# Patient Record
Sex: FEMALE | Race: WHITE
[De-identification: names, ages, dates, MRNs, and addresses within clinical notes are randomized per-mention and may not be internally consistent; named-entity substitution may affect disease eponyms.]

---

## 2019-08-04 ENCOUNTER — HOSPITAL ENCOUNTER (EMERGENCY)
Dept: HOSPITAL 10 - E/R | Age: 29
Discharge: HOME | End: 2019-08-04
Payer: MEDICAID

## 2019-08-04 ENCOUNTER — HOSPITAL ENCOUNTER (EMERGENCY)
Dept: HOSPITAL 91 - E/R | Age: 29
Discharge: HOME | End: 2019-08-04
Payer: MEDICAID

## 2019-08-04 VITALS — HEART RATE: 65 BPM | RESPIRATION RATE: 16 BRPM | DIASTOLIC BLOOD PRESSURE: 60 MMHG | SYSTOLIC BLOOD PRESSURE: 111 MMHG

## 2019-08-04 VITALS
WEIGHT: 135.14 LBS | BODY MASS INDEX: 23.95 KG/M2 | HEIGHT: 63 IN | WEIGHT: 135.14 LBS | HEIGHT: 63 IN | BODY MASS INDEX: 23.95 KG/M2

## 2019-08-04 DIAGNOSIS — J45.909: ICD-10-CM

## 2019-08-04 DIAGNOSIS — N12: Primary | ICD-10-CM

## 2019-08-04 DIAGNOSIS — F17.210: ICD-10-CM

## 2019-08-04 LAB
ADD MAN DIFF?: NO
ADD UMIC: YES
ANION GAP: 8 (ref 5–13)
BASOPHIL #: 0.1 10^3/UL (ref 0–0.1)
BASOPHILS %: 0.4 % (ref 0–2)
BLOOD UREA NITROGEN: 8 MG/DL (ref 7–20)
CALCIUM: 9.9 MG/DL (ref 8.4–10.2)
CARBON DIOXIDE: 26 MMOL/L (ref 21–31)
CHLORIDE: 105 MMOL/L (ref 97–110)
CREATININE: 0.72 MG/DL (ref 0.44–1)
EOSINOPHILS #: 0.2 10^3/UL (ref 0–0.5)
EOSINOPHILS %: 1.1 % (ref 0–7)
GLUCOSE: 95 MG/DL (ref 70–220)
HEMATOCRIT: 38 % (ref 37–47)
HEMOGLOBIN: 12.8 G/DL (ref 12–16)
IMMATURE GRANS #M: 0.09 10^3/UL (ref 0–0.03)
IMMATURE GRANS % (M): 0.5 % (ref 0–0.43)
LYMPHOCYTES #: 2.3 10^3/UL (ref 0.8–2.9)
LYMPHOCYTES %: 12.7 % (ref 15–51)
MEAN CORPUSCULAR HEMOGLOBIN: 31.1 PG (ref 29–33)
MEAN CORPUSCULAR HGB CONC: 33.7 G/DL (ref 32–37)
MEAN CORPUSCULAR VOLUME: 92.5 FL (ref 82–101)
MEAN PLATELET VOLUME: 10.3 FL (ref 7.4–10.4)
MONOCYTE #: 1.1 10^3/UL (ref 0.3–0.9)
MONOCYTES %: 6.3 % (ref 0–11)
NEUTROPHIL #: 14.1 10^3/UL (ref 1.6–7.5)
NEUTROPHILS %: 79 % (ref 39–77)
NUCLEATED RED BLOOD CELLS #: 0 10^3/UL (ref 0–0)
NUCLEATED RED BLOOD CELLS%: 0 /100WBC (ref 0–0)
PLATELET COUNT: 278 10^3/UL (ref 140–415)
POTASSIUM: 3.3 MMOL/L (ref 3.5–5.1)
RED BLOOD COUNT: 4.11 10^6/UL (ref 4.2–5.4)
RED CELL DISTRIBUTION WIDTH: 12.6 % (ref 11.5–14.5)
SODIUM: 139 MMOL/L (ref 135–144)
UR ASCORBIC ACID: NEGATIVE MG/DL
UR BACTERIA: (no result) /HPF
UR BILIRUBIN (DIP): NEGATIVE MG/DL
UR BLOOD (DIP): (no result) MG/DL
UR CLARITY: (no result)
UR COLOR: YELLOW
UR GLUCOSE (DIP): NEGATIVE MG/DL
UR KETONES (DIP): (no result) MG/DL
UR LEUKOCYTE ESTERASE (DIP): (no result) LEU/UL
UR MUCUS: (no result) /HPF
UR NITRITE (DIP): NEGATIVE MG/DL
UR PH (DIP): 9 (ref 5–9)
UR RBC: 17 /HPF (ref 0–5)
UR SPECIFIC GRAVITY (DIP): 1.01 (ref 1–1.03)
UR SQUAMOUS EPITHELIAL CELL: (no result) /HPF
UR TOTAL PROTEIN (DIP): (no result) MG/DL
UR UROBILINOGEN (DIP): NEGATIVE MG/DL
UR WBC: > 182 /HPF (ref 0–5)
WHITE BLOOD COUNT: 17.8 10^3/UL (ref 4.8–10.8)

## 2019-08-04 PROCEDURE — 81001 URINALYSIS AUTO W/SCOPE: CPT

## 2019-08-04 PROCEDURE — 99284 EMERGENCY DEPT VISIT MOD MDM: CPT

## 2019-08-04 PROCEDURE — 85025 COMPLETE CBC W/AUTO DIFF WBC: CPT

## 2019-08-04 PROCEDURE — 81025 URINE PREGNANCY TEST: CPT

## 2019-08-04 PROCEDURE — 80048 BASIC METABOLIC PNL TOTAL CA: CPT

## 2019-08-04 PROCEDURE — 96372 THER/PROPH/DIAG INJ SC/IM: CPT

## 2019-08-04 RX ADMIN — IBUPROFEN 1 MG: 600 TABLET ORAL at 20:10

## 2019-08-04 RX ADMIN — CEFTRIAXONE SODIUM 1 GM: 1 INJECTION, POWDER, FOR SOLUTION INTRAMUSCULAR; INTRAVENOUS at 20:10

## 2019-08-04 NOTE — ERD
ER Documentation


Chief Complaint


Chief Complaint





LEFT FLANK PAIN X1DAY; HX OF KIDNEY STONES





HPI


28-year-old female presenting with left flank pain that started today.  She 


states that she has a history of a couple of kidney infections, always on the 


left side.  Pain is severe, 8 out of 10, aching and stabbing, constant, with no 


alleviating factors.  Exacerbated by movement.  She states she never has dysuria


hematuria or any other warning signs.  She states that infection usually goes 


immediately to her kidney.  She has refused hospitalizations in the past because


she has several kids at home.  Currently she feels it is very early in the 


course of her infection and wants to go home with oral antibiotics and does not 


want to be admitted in any case.  She denies any fever, chills, nausea, 


vomiting.





ROS


All systems reviewed and are negative except as per history of present illness.





Medications


Home Meds


Active Scripts


Cephalexin* (Keflex*) 500 Mg Capsule, 500 MG PO QID for 10 Days, CAP


   Prov:CHIKIS ESPOSITO MD         8/4/19


Ibuprofen* (Motrin*) 600 Mg Tab, 600 MG PO Q6H PRN for PAIN AND OR ELEVATED 


TEMP, #30 TAB


   Prov:CHIKIS ESPOSITO MD         8/4/19





Allergies


Allergies:  


Coded Allergies:  


     acetaminophen (Verified  Allergy, Unknown, 8/4/19)


     hydrocodone (Verified  Allergy, Unknown, 8/4/19)





PMhx/Soc


History of Surgery:  Yes (tubal ligation)


Hx Respiratory Disorders:  Yes (asthma)


Hx Miscellaneous Medical Probl:  Yes (frequent UTIs)


Hx Alcohol Use:  Yes (once every other month)


Hx Substance Use:  Yes (marijuana daily)


Hx Tobacco Use:  Yes (1/2 pack daily)


Smoking Status:  Current every day smoker





FmHx


Family History:  No diabetes





Physical Exam


Vitals





Vital Signs


  Date      Temp  Pulse  Resp  B/P (MAP)   Pulse Ox  O2          O2 Flow    FiO2


Time                                                 Delivery    Rate


    8/4/19  99.0     65    16      111/60        98  Room Air


     21:21                           (77)


    8/4/19  99.1     79    16      108/75       100  Room Air


     20:03                           (86)


    8/4/19  99.3    120    20      127/87        99


     18:23                          (100)





Physical Exam


Const:   No acute distress, well-appearing, nontoxic


Head:   Atraumatic 


Eyes:    Normal Conjunctiva


ENT:    Normal External Ears, Nose and Mouth.


Neck:               Full range of motion. No meningismus.


Resp:   Clear to auscultation bilaterally


Cardio:   Tachycardic with regular  rhythm, no murmurs


Abd:    Soft, non tender, non distended. Normal bowel sounds


Skin:   No petechiae or rashes


Back:   Moderate left CVA tenderness


Ext:    No cyanosis, or edema


Neur:   Awake and alert


Psych:    Normal Mood and Affect


Result Diagram:  


8/4/19 1959 8/4/19 1959





Results 24 hrs





Laboratory Tests


Test
                                 8/4/19
19:50    8/4/19
19:59  8/4/19
20:07


Urine Color                        YELLOW


Urine Clarity                      CLOUDY


Urine pH                                      9.0


Urine Specific Gravity                      1.008


Urine Ketones                            2+ mg/dL


Urine Nitrite                      NEGATIVE mg/dL


Urine Bilirubin                    NEGATIVE mg/dL


Urine Urobilinogen                 NEGATIVE mg/dL


Urine Leukocyte Esterase                3+ Doe/ul


Urine Microscopic RBC                     17 /HPF


Urine Microscopic WBC              > 182 /HPF


Urine Squamous Epithelial
Cells    FEW /HPF 
       
               



Urine Bacteria                     FEW /HPF


Urine Mucus                        FEW /HPF


Urine Hemoglobin                         2+ mg/dL


Urine Glucose                      NEGATIVE mg/dL


Urine Total Protein                      2+ mg/dl


White Blood Count                                    17.8 10^3/ul


Red Blood Count                                      4.11 10^6/ul


Hemoglobin                                              12.8 g/dl


Hematocrit                                                 38.0 %


Mean Corpuscular Volume                                   92.5 fl


Mean Corpuscular Hemoglobin                               31.1 pg


Mean Corpuscular                   
                   33.7 g/dl 
  



Hemoglobin
Concent


Red Cell Distribution Width                                12.6 %


Platelet Count                                        278 10^3/UL


Mean Platelet Volume                                      10.3 fl


Immature Granulocytes %                                   0.500 %


Neutrophils %                                              79.0 %


Lymphocytes %                                              12.7 %


Monocytes %                                                 6.3 %


Eosinophils %                                               1.1 %


Basophils %                                                 0.4 %


Nucleated Red Blood Cells %                           0.0 /100WBC


Immature Granulocytes #                             0.090 10^3/ul


Neutrophils #                                        14.1 10^3/ul


Lymphocytes #                                         2.3 10^3/ul


Monocytes #                                           1.1 10^3/ul


Eosinophils #                                         0.2 10^3/ul


Basophils #                                           0.1 10^3/ul


Nucleated Red Blood Cells #                           0.0 10^3/ul


Sodium Level                                           139 mmol/L


Potassium Level                                        3.3 mmol/L


Chloride Level                                         105 mmol/L


Carbon Dioxide Level                                    26 mmol/L


Anion Gap                                                       8


Blood Urea Nitrogen                                       8 mg/dl


Creatinine                                             0.72 mg/dl


Est Glomerular Filtrat             
                > 60 mL/min 
   



Rate
mL/min


Glucose Level                                            95 mg/dl


Calcium Level                                           9.9 mg/dl


POC Beta HCG, Qualitative                                           NEGATIVE





Current Medications


 Medications
   Dose
          Sig/Todd
       Start Time
   Status  Last


 (Trade)       Ordered        Route
 PRN     Stop Time              Admin
Dose


                              Reason                                Admin


 Ceftriaxone    1 gm           ONCE  ONCE
    8/4/19        DC            8/4/19


Sodium
                       IM
            20:30
 8/4/19                20:10



(Rocephin)                                   20:31


 Ibuprofen
     600 mg         ONCE  ONCE
    8/4/19        DC            8/4/19


(Motrin)                      PO
            20:30
 8/4/19                20:10



                                             20:31








Procedures/MDM


EMERGENT LABS AND DIAGNOSTIC STUDIES: 


Lab Results above were reviewed and interpreted by me. 





CBC: Leukocytosis, concerning for infection


BMP: No evidence of clinically significant electrolyte abnormality, acidosis, 


renal failure, hypoglycemia


UA: + evidence of infection 





___________________________________________________________ 


Initial Nursing notes reviewed. 


Previous Medical Records requested via the Electronic Health Record. 





EMERGENCY DEPARTMENT COURSE / MEDICAL DECISION MAKING: 





Patient is presenting with signs and symptoms of sepsis and pyelonephritis.  She


was given a shot of Rocephin here.  Basic work-up was done.  I did again offer 


the patient admission but she would rather go home.  I feel this is an 


acceptable plan at this time.  I gave her a prescription for Keflex as this is 


helped her in the past.  I encouraged her to return for any worsening symptoms 


or if she is not improving as expected.  Patient understands discharge plan.





Patient's blood pressure was elevated (>120/80) but appears stable without 


evidence of hypertensive emergency or urgency. The patient was counseled about 


the risks of hypertension and urged to pursue outpatient monitoring and therapy 


within a week with their primary care physician.





Departure


Diagnosis:  


   Primary Impression:  


   Pyelonephritis


Condition:  Stable


Patient Instructions:  Pyelonephritis, Female (Adult)


Referrals:  


[facility]


YOU HAVE RECEIVED A MEDICAL SCREENING EXAM AND THE RESULTS INDICATE THAT YOU DO 


NOT HAVE A CONDITION THAT REQUIRES URGENT TREATMENT IN THE EMERGENCY DEPARTMENT.





FURTHER EVALUATION AND TREATMENT OF YOUR CONDITION CAN WAIT UNTIL YOU ARE SEEN 


IN YOUR DOCTORS OFFICE WITHIN THE NEXT 1-2 DAYS. IT IS YOUR RESPONSIBILITY TO 


MAKE AN APPOINTMENT FOR FOLOW-UP CARE.





IF YOU HAVE A PRIMARY DOCTOR


--you should call your primary doctor and schedule an appointment





IF YOU DO NOT HAVE A PRIMARY DOCTOR YOU CAN CALL OUR PHYSICIAN REFERRAL HOTLINE 


AT


 [phone] 





IF YOU CAN NOT AFFORD TO SEE A PHYSICIAN YOU CAN CHOSE FROM THE FOLLOWING [facility] [phone] [address] [facility]





[facility] [phone] [address] [facility] [phone] [address] [facility] [phone] [address] [facility] [phone] [address] [facility] [address]. [facility] [phone] [address] [facility]. [facility]


YOU HAVE RECEIVED A MEDICAL SCREENING EXAM AND THE RESULTS INDICATE THAT YOU DO 


NOT HAVE A CONDITION THAT REQUIRES URGENT TREATMENT IN THE EMERGENCY DEPARTMENT.





FURTHER EVALUATION AND TREATMENT OF YOUR CONDITION CAN WAIT UNTIL YOU ARE SEEN 


IN YOUR DOCTORS OFFICE WITHIN THE NEXT 1-2 DAYS. IT IS YOUR RESPONSIBILITY TO 


MAKE AN APPOINTMENT FOR FOLOW-UP CARE.





IF YOU HAVE A PRIMARY DOCTOR


--you should call your primary doctor and schedule and appointment





IF YOU DO NOT HAVE A PRIMARY DOCTOR YOU CAN CALL OUR PHYSICIAN REFERRAL HOTLINE 


AT [phone].





IF YOU CAN NOT AFFORD TO SEE A PHYSICIAN YOU CAN CHOSE FROM THE FOLLOWING [facility]:





[facility]


[address] [place] [address]





[facility]


[address] [place] [address]





[facility]


[address] [place] [address]





Additional Instructions:  


Return to the ER if you are having any worsening symptoms or you are not 


improving as expected.











CHIKIS ESPOSITO MD          Aug 4, 2019 20:26